# Patient Record
Sex: MALE | Race: OTHER | NOT HISPANIC OR LATINO | Employment: UNEMPLOYED | URBAN - METROPOLITAN AREA
[De-identification: names, ages, dates, MRNs, and addresses within clinical notes are randomized per-mention and may not be internally consistent; named-entity substitution may affect disease eponyms.]

---

## 2021-04-06 LAB
EXTERNAL HIV CONFIRMATION: NORMAL
EXTERNAL HIV SCREEN: NORMAL

## 2023-11-19 LAB — HCV AB SER-ACNC: NONREACTIVE

## 2023-12-04 ENCOUNTER — OFFICE VISIT (OUTPATIENT)
Dept: FAMILY MEDICINE CLINIC | Facility: CLINIC | Age: 63
End: 2023-12-04

## 2023-12-04 VITALS
OXYGEN SATURATION: 99 % | HEART RATE: 60 BPM | BODY MASS INDEX: 20.92 KG/M2 | TEMPERATURE: 97.2 F | WEIGHT: 138 LBS | HEIGHT: 68 IN | DIASTOLIC BLOOD PRESSURE: 60 MMHG | SYSTOLIC BLOOD PRESSURE: 100 MMHG

## 2023-12-04 DIAGNOSIS — R13.19 OTHER DYSPHAGIA: ICD-10-CM

## 2023-12-04 DIAGNOSIS — Z23 NEED FOR INFLUENZA VACCINATION: ICD-10-CM

## 2023-12-04 DIAGNOSIS — R00.2 PALPITATION: ICD-10-CM

## 2023-12-04 DIAGNOSIS — R91.1 PULMONARY NODULE, RIGHT: ICD-10-CM

## 2023-12-04 DIAGNOSIS — Z00.01 ENCOUNTER FOR WELL ADULT EXAM WITH ABNORMAL FINDINGS: Primary | ICD-10-CM

## 2023-12-04 DIAGNOSIS — J96.01 ACUTE RESPIRATORY FAILURE WITH HYPOXIA (HCC): ICD-10-CM

## 2023-12-04 DIAGNOSIS — Z23 ENCOUNTER FOR IMMUNIZATION: ICD-10-CM

## 2023-12-04 PROBLEM — R79.89 ELEVATED BRAIN NATRIURETIC PEPTIDE (BNP) LEVEL: Status: ACTIVE | Noted: 2023-11-19

## 2023-12-04 PROCEDURE — 90686 IIV4 VACC NO PRSV 0.5 ML IM: CPT

## 2023-12-04 PROCEDURE — 99215 OFFICE O/P EST HI 40 MIN: CPT | Performed by: FAMILY MEDICINE

## 2023-12-04 PROCEDURE — 90471 IMMUNIZATION ADMIN: CPT

## 2023-12-04 PROCEDURE — 99386 PREV VISIT NEW AGE 40-64: CPT | Performed by: FAMILY MEDICINE

## 2023-12-04 RX ORDER — FUROSEMIDE 20 MG/1
20 TABLET ORAL DAILY PRN
COMMUNITY
Start: 2023-11-20 | End: 2023-12-20

## 2023-12-04 NOTE — PROGRESS NOTES
ADULT ANNUAL PHYSICAL  [address] [facility] PRIMARY CARE [facility]    NAME: Sophie Martinez  AGE: 61 y.o. SEX: male  : 1960     DATE: 2023     Assessment and Plan:     Problem List Items Addressed This Visit     Acute respiratory failure with hypoxia [facility])     Status post recent hospitalization at [facility] ([place], [place]) record review patient had abnormal finding on the CAT scan of the lung and no history of smoking recommendation to do pulmonary function test and CAT scan in 6 to 8 weeks         Encounter for well adult exam with abnormal findings - Primary     Advice and education were given regarding nutrition, aerobic exercises, weight-bearing exercises, cardiovascular risk reduction, fall risk reduction, and age-appropriate supplements. The patient was counseled regarding instructions for management, risk factor reductions, prognosis, risks and benefits of treatment options, patient and family education, and importance of compliance with treatment. Pulmonary nodule, right     Incidental finding on the CAT scan of the chest done in 2023 recommend to follow-up with CAT scan in 6 to 8-week         Other dysphagia     New diagnosis dysphagia with weight loss recommend patient to evaluation by GI for EGD         Palpitation     Symptomatic I reviewed her recent record EKG and echocardiogram done at [facility] ([place], [place]) ejection fraction 60 to 65% and no other abnormal finding my recommendation to do Holter monitor for 48 hours        Other Visit Diagnoses     Encounter for immunization        Relevant Orders    FLUZONE: influenza vaccine, quadrivalent, 0.5 mL (Completed)    Need for influenza vaccination        Relevant Orders    FLUZONE: influenza vaccine, quadrivalent, 0.5 mL (Completed)            Immunizations and preventive care screenings were discussed with patient today.  Appropriate education was printed on patient's after visit summary. Discussed risks and benefits of prostate cancer screening. We discussed the controversial history of PSA screening for prostate cancer in the [facility] as well as the risk of over detection and over treatment of prostate cancer by way of PSA screening. The patient understands that PSA blood testing is an imperfect way to screen for prostate cancer and that elevated PSA levels in the blood may also be caused by infection, inflammation, prostatic trauma or manipulation, urological procedures, or by benign prostatic enlargement. The role of the digital rectal examination in prostate cancer screening was also discussed and I discussed with him that there is large interobserver variability in the findings of digital rectal examination. Counseling:  Alcohol/drug use: discussed moderation in alcohol intake, the recommendations for healthy alcohol use, and avoidance of illicit drug use. Dental Health: discussed importance of regular tooth brushing, flossing, and dental visits. Injury prevention: discussed safety/seat belts, safety helmets, smoke detectors, carbon dioxide detectors, and smoking near bedding or upholstery. Sexual health: discussed sexually transmitted diseases, partner selection, use of condoms, avoidance of unintended pregnancy, and contraceptive alternatives. Exercise: the importance of regular exercise/physical activity was discussed. Recommend exercise 3-5 times per week for at least 30 minutes. Depression Screening and Follow-up Plan: Patient was screened for depression during today's encounter. They screened negative with a PHQ-2 score of 0. Return in about 1 year (around 12/4/2024).      Chief Complaint:     Chief Complaint   Patient presents with   • New Patient Visit   • Physical Exam      History of Present Illness:     Adult Annual Physical   Patient here for a comprehensive physical exam. The patient reports problems - patient with his brother in room ,he was recently at [facility] November 18/23 for SOB patient Dx with respiratory failure with hypoxemia record review include Ctscan of chest echo cardiogram discuss with patient incidental  finding on the CAT scan of the chest pulmonary nodule patient denies any smoking history patient also been complaining about difficulty swallowing and feels the food stuck in his distal to unweighted change no wheezing no abdomen pain no abdomen distention no diarrhea no blood in the stool I reviewed records the previous visit on the 2020 patient had barium swallow and it was negative he did not evaluated by GI yet currently lives in [place] and he was visiting his family here is going back in December 17 also patient concerned about the palpitation he feels his heartbeat in his chest no short of breath he had a lower extremity edema the echocardiogram during the hospitalization ejection fraction is normal currently on Lasix . Diet and Physical Activity  Diet/Nutrition:  no special diet . Exercise: no formal exercise. Depression Screening  PHQ-2/9 Depression Screening    Little interest or pleasure in doing things: 0 - not at all  Feeling down, depressed, or hopeless: 0 - not at all  PHQ-2 Score: 0  PHQ-2 Interpretation: Negative depression screen       General Health  Sleep: sleeps well. Hearing: normal - bilateral.  Vision: no vision problems. Dental: brushes teeth twice daily.  Health  Symptoms include: none    Advanced Care Planning  Do you have an advanced directive? no  Do you have a durable medical power of ? no     Review of Systems:     Review of Systems   Constitutional:  Negative for chills and fever. HENT:  Positive for trouble swallowing. Negative for ear pain and sore throat. Eyes:  Negative for pain and visual disturbance. Respiratory:  Negative for cough and shortness of breath. Cardiovascular:  Positive for palpitations. Negative for chest pain.    Gastrointestinal:  Negative for abdominal pain, constipation, diarrhea and vomiting. Genitourinary:  Negative for dysuria and hematuria. Musculoskeletal:  Negative for arthralgias and back pain. Skin:  Negative for color change and rash. Neurological:  Negative for seizures and syncope. All other systems reviewed and are negative. Past Medical History:     Past Medical History:   Diagnosis Date   • Heart failure [facility])       Past Surgical History:     History reviewed. No pertinent surgical history. Family History:     Family History   Problem Relation Age of Onset   • Diabetes type II Family    • Cancer Family    • Parkinsonism Family       Social History:     Social History     Socioeconomic History   • Marital status: Single     Spouse name: None   • Number of children: None   • Years of education: None   • Highest education level: None   Occupational History   • None   Tobacco Use   • Smoking status: Never     Passive exposure: Never   • Smokeless tobacco: Never   Substance and Sexual Activity   • Alcohol use: Never   • Drug use: None   • Sexual activity: None   Other Topics Concern   • None   Social History Narrative   • None     Social Determinants of Health     Financial Resource Strain: Not on file   Food Insecurity: Not on file   Transportation Needs: Not on file   Physical Activity: Not on file   Stress: Not on file   Social Connections: Not on file   Intimate Partner Violence: Not on file   Housing Stability: Not on file      Current Medications:     Current Outpatient Medications   Medication Sig Dispense Refill   • furosemide (LASIX) 20 mg tablet Take 20 mg by mouth daily as needed       No current facility-administered medications for this visit.       Allergies:     No Known Allergies   Physical Exam:     /60 (BP Location: Left arm, Patient Position: Sitting)   Pulse 60   Temp (!) 97.2 °F (36.2 °C) (Tympanic)   Ht 5' 8.11" (1.73 m)   Wt 62.6 kg (138 lb)   SpO2 99%   BMI 20.92 kg/m²     Physical Exam  Vitals and nursing note reviewed. Constitutional:       General: He is not in acute distress. Appearance: He is well-developed. HENT:      Head: Normocephalic and atraumatic. Right Ear: Tympanic membrane normal. There is no impacted cerumen. Left Ear: Tympanic membrane normal. There is no impacted cerumen. Nose: No congestion or rhinorrhea. Mouth/Throat:      Pharynx: No oropharyngeal exudate or posterior oropharyngeal erythema. Eyes:      General:         Right eye: No discharge. Left eye: No discharge. Conjunctiva/sclera: Conjunctivae normal.   Cardiovascular:      Rate and Rhythm: Normal rate and regular rhythm. Heart sounds: No murmur heard. Pulmonary:      Effort: Pulmonary effort is normal. No respiratory distress. Breath sounds: Normal breath sounds. Abdominal:      Palpations: Abdomen is soft. Tenderness: There is no abdominal tenderness. Musculoskeletal:         General: No swelling. Cervical back: Neck supple. Skin:     General: Skin is warm and dry. Capillary Refill: Capillary refill takes less than 2 seconds. Findings: No rash. Neurological:      Mental Status: He is alert and oriented to person, place, and time.    Psychiatric:         Mood and Affect: Mood normal.          Enriqueta Oneal MD  [address]

## 2023-12-04 NOTE — ASSESSMENT & PLAN NOTE
Status post recent hospitalization at [facility] record review patient had abnormal finding on the CAT scan of the lung and no history of smoking recommendation to do pulmonary function test and CAT scan in 6 to 8 weeks

## 2023-12-04 NOTE — PATIENT INSTRUCTIONS
Encounter for well adult exam with abnormal findings  Advice and education were given regarding nutrition, aerobic exercises, weight-bearing exercises, cardiovascular risk reduction, fall risk reduction, and age-appropriate supplements. The patient was counseled regarding instructions for management, risk factor reductions, prognosis, risks and benefits of treatment options, patient and family education, and importance of compliance with treatment.     Acute respiratory failure with hypoxia [facility])  Status post recent hospitalization at [facility] record review patient had abnormal finding on the CAT scan of the lung and no history of smoking recommendation to do pulmonary function test and CAT scan in 6 to 8 weeks    Pulmonary nodule, right  Incidental finding on the CAT scan of the chest done in November 2023 recommend to follow-up with CAT scan in 6 to 8-week    Other dysphagia  New diagnosis dysphagia with weight loss recommend patient to evaluation by GI for EGD

## 2023-12-04 NOTE — ASSESSMENT & PLAN NOTE
Incidental finding on the CAT scan of the chest done in November 2023 recommend to follow-up with CAT scan in 6 to 8-week

## 2023-12-05 ENCOUNTER — TELEPHONE (OUTPATIENT)
Dept: FAMILY MEDICINE CLINIC | Facility: CLINIC | Age: 63
End: 2023-12-05

## 2023-12-05 NOTE — TELEPHONE ENCOUNTER
Patient called saying he had very bad left side pain when he woke up but it only lasted 5 minutes. He wanted to know what that could have been. Also you recommend CT of lung in 6-8 weeks, can I put in order for patient?

## 2023-12-07 DIAGNOSIS — R91.1 PULMONARY NODULE, RIGHT: Primary | ICD-10-CM

## 2023-12-07 DIAGNOSIS — R52 PAIN OF LEFT SIDE OF BODY: Primary | ICD-10-CM

## 2023-12-07 RX ORDER — ACETAMINOPHEN 500 MG
500 TABLET ORAL 3 TIMES DAILY
Qty: 180 TABLET | Refills: 0 | Status: SHIPPED | OUTPATIENT
Start: 2023-12-07

## 2023-12-07 NOTE — ASSESSMENT & PLAN NOTE
Symptomatic I reviewed her recent record EKG and echocardiogram done at [facility] ejection fraction 60 to 65% and no other abnormal finding my recommendation to do Holter monitor for 48 hours

## 2023-12-07 NOTE — TELEPHONE ENCOUNTER
Tried leaving a message but mailbox is full. Tylenol sent to pharmacy on [address].  I will mail the Ct chest order to him so he can take it to [place] and follow up with doctor from there

## 2023-12-11 ENCOUNTER — TELEPHONE (OUTPATIENT)
Dept: FAMILY MEDICINE CLINIC | Facility: CLINIC | Age: 63
End: 2023-12-11

## 2023-12-29 ENCOUNTER — HOSPITAL ENCOUNTER (OUTPATIENT)
Dept: CT IMAGING | Facility: HOSPITAL | Age: 63
Discharge: HOME | End: 2023-12-29

## 2023-12-29 DIAGNOSIS — R91.1 PULMONARY NODULE, RIGHT: ICD-10-CM

## 2023-12-29 PROCEDURE — G1004 CDSM NDSC: HCPCS

## 2023-12-29 PROCEDURE — 71250 CT THORAX DX C-: CPT

## 2024-01-10 ENCOUNTER — TELEPHONE (OUTPATIENT)
Dept: FAMILY MEDICINE CLINIC | Facility: CLINIC | Age: 64
End: 2024-01-10

## 2024-01-10 ENCOUNTER — TELEPHONE (OUTPATIENT)
Age: 64
End: 2024-01-10

## 2024-01-10 NOTE — TELEPHONE ENCOUNTER
Bill    Patient called looking for his CT Chest Results, I did let patient know I would send a message to  to have some one call him regarding results.    CB: [phone]

## 2024-01-10 NOTE — TELEPHONE ENCOUNTER
Patient called to get CT scan test results. Reviewed the results and the recommendation for a f/u CT scan in 1 year per Dr. Garcias. Patient wanted to know if he needs medication for the nodule. He also wanted to let his Dr that he still has swelling in his hands and legs. States this is not new and it doesn't happen every day. He asked if he should be watching his salt intake. Confirmed that he should watch his sodium content io what he eats by looking at labels and also refrain from table salt.

## 2024-01-10 NOTE — TELEPHONE ENCOUNTER
----- Message from Estefani Garcias MD sent at 1/8/2024  8:52 AM EST -----  4 mm left lower lobe pulmonary nodule ,plan to repeat Ctscan of chest without contrast in 1y

## 2024-01-11 ENCOUNTER — TELEPHONE (OUTPATIENT)
Dept: FAMILY MEDICINE CLINIC | Facility: CLINIC | Age: 64
End: 2024-01-11

## 2024-01-11 NOTE — TELEPHONE ENCOUNTER
Lm for patient. Dr. Garcias will monitor pulmonary nodules yearly. Regarding swelling in feet and hands, we would like to have patient come into the office to be evaluated. (Note for access center- call the office and we can double book patient).

## 2024-07-15 ENCOUNTER — TELEPHONE (OUTPATIENT)
Dept: FAMILY MEDICINE CLINIC | Facility: CLINIC | Age: 64
End: 2024-07-15

## 2024-12-20 ENCOUNTER — TELEPHONE (OUTPATIENT)
Dept: FAMILY MEDICINE CLINIC | Facility: CLINIC | Age: 64
End: 2024-12-20

## 2024-12-27 NOTE — TELEPHONE ENCOUNTER
12/26/24 10:07 PM        The office's request has been received, reviewed, and the patient chart updated. The PCP has successfully been removed with a patient attribution note. This message will now be completed.        Thank you  Rafiq Barker

## 2025-01-27 ENCOUNTER — TELEPHONE (OUTPATIENT)
Dept: ADMINISTRATIVE | Facility: OTHER | Age: 65
End: 2025-01-27

## 2025-01-27 NOTE — TELEPHONE ENCOUNTER
----- Message from Acacia LINARES sent at 1/27/2025  7:30 AM EST -----  Regarding: HCV  01/27/25 7:30 AM    Hello, our patient attached above has had Hepatitis C completed/performed. Please assist in updating the patient chart by pulling the Care Everywhere (CE) document. The date of service is 11/19/23.     Thank you,  Acacia Camilo  [facility]

## 2025-01-27 NOTE — TELEPHONE ENCOUNTER
----- Message from Acacia LINARES sent at 1/27/2025  7:31 AM EST -----  Regarding: HIV  01/27/25 7:31 AM    Hello, our patient attached above has had HIV completed/performed. Please assist in updating the patient chart by pulling the Care Everywhere (CE) document. The date of service is 4/6/21.     Thank you,  Acacia MELISSA

## 2025-01-27 NOTE — TELEPHONE ENCOUNTER
Upon review of the In Basket request we were able to locate, review, and update the patient chart as requested for Hepatitis C  and HIV.    Any additional questions or concerns should be emailed to the Practice Liaisons via the appropriate education email address, please do not reply via In Basket.    Thank you  Marianna Tejada MA   [facility]